# Patient Record
Sex: FEMALE | Race: WHITE | NOT HISPANIC OR LATINO | Employment: OTHER | URBAN - METROPOLITAN AREA
[De-identification: names, ages, dates, MRNs, and addresses within clinical notes are randomized per-mention and may not be internally consistent; named-entity substitution may affect disease eponyms.]

---

## 2017-01-11 ENCOUNTER — ALLSCRIPTS OFFICE VISIT (OUTPATIENT)
Dept: OTHER | Facility: OTHER | Age: 67
End: 2017-01-11

## 2017-02-15 ENCOUNTER — GENERIC CONVERSION - ENCOUNTER (OUTPATIENT)
Dept: OTHER | Facility: OTHER | Age: 67
End: 2017-02-15

## 2017-02-21 ENCOUNTER — ALLSCRIPTS OFFICE VISIT (OUTPATIENT)
Dept: OTHER | Facility: OTHER | Age: 67
End: 2017-02-21

## 2017-03-17 ENCOUNTER — GENERIC CONVERSION - ENCOUNTER (OUTPATIENT)
Dept: OTHER | Facility: OTHER | Age: 67
End: 2017-03-17

## 2017-03-27 ENCOUNTER — GENERIC CONVERSION - ENCOUNTER (OUTPATIENT)
Dept: OTHER | Facility: OTHER | Age: 67
End: 2017-03-27

## 2017-04-04 ENCOUNTER — ALLSCRIPTS OFFICE VISIT (OUTPATIENT)
Dept: OTHER | Facility: OTHER | Age: 67
End: 2017-04-04

## 2017-04-04 DIAGNOSIS — Z96.7 PRESENCE OF OTHER BONE AND TENDON IMPLANTS: ICD-10-CM

## 2017-04-05 ENCOUNTER — GENERIC CONVERSION - ENCOUNTER (OUTPATIENT)
Dept: OTHER | Facility: OTHER | Age: 67
End: 2017-04-05

## 2017-04-18 ENCOUNTER — GENERIC CONVERSION - ENCOUNTER (OUTPATIENT)
Dept: OTHER | Facility: OTHER | Age: 67
End: 2017-04-18

## 2017-05-16 ENCOUNTER — ALLSCRIPTS OFFICE VISIT (OUTPATIENT)
Dept: OTHER | Facility: OTHER | Age: 67
End: 2017-05-16

## 2017-05-22 ENCOUNTER — GENERIC CONVERSION - ENCOUNTER (OUTPATIENT)
Dept: OTHER | Facility: OTHER | Age: 67
End: 2017-05-22

## 2017-06-05 ENCOUNTER — GENERIC CONVERSION - ENCOUNTER (OUTPATIENT)
Dept: OTHER | Facility: OTHER | Age: 67
End: 2017-06-05

## 2017-07-03 ENCOUNTER — GENERIC CONVERSION - ENCOUNTER (OUTPATIENT)
Dept: OTHER | Facility: OTHER | Age: 67
End: 2017-07-03

## 2017-07-17 ENCOUNTER — GENERIC CONVERSION - ENCOUNTER (OUTPATIENT)
Dept: OTHER | Facility: OTHER | Age: 67
End: 2017-07-17

## 2017-07-18 ENCOUNTER — ALLSCRIPTS OFFICE VISIT (OUTPATIENT)
Dept: OTHER | Facility: OTHER | Age: 67
End: 2017-07-18

## 2017-07-18 DIAGNOSIS — M25.561 PAIN IN RIGHT KNEE: ICD-10-CM

## 2017-07-19 ENCOUNTER — GENERIC CONVERSION - ENCOUNTER (OUTPATIENT)
Dept: OTHER | Facility: OTHER | Age: 67
End: 2017-07-19

## 2017-08-03 ENCOUNTER — GENERIC CONVERSION - ENCOUNTER (OUTPATIENT)
Dept: OTHER | Facility: OTHER | Age: 67
End: 2017-08-03

## 2017-10-17 ENCOUNTER — ALLSCRIPTS OFFICE VISIT (OUTPATIENT)
Dept: OTHER | Facility: OTHER | Age: 67
End: 2017-10-17

## 2018-01-11 NOTE — PROGRESS NOTES
Assessment    1  Contracture of right knee (076 24) (M24 561)    Modest improvement in her right knee motion following nonsurgical management right knee contracture, this patient continue to work on achieving greater flexion of physical therapy  I would welcome the opportunity see back in 3 months time, and this wafer physical exam only  Plan  Contracture of right knee    · Follow-up visit in 3 months Evaluation and Treatment  Follow-up  Status: Hold For -  Scheduling  Requested for: [id number]    History of Present Illness  HPI: Over one year following partial patellectomy made of the right knee as salvage procedure for comminuted right patella fracture, this patient describes improve motion  Although her acute physical therapy benefits have been exhausted, she pays $15 a month, goes to the gym 4 times a week  With continued efforts and achieving greater flexion, she describes improvement in her flexion  She describes persistence of pain in the right knee joint however  Review of Systems    Constitutional: No fever, no chills, feels well, no tiredness, no recent weight gain or loss  Eyes: No complaints of eyesight problems, no red eyes  ENT: no loss of hearing, no nosebleeds, no sore throat  Cardiovascular: No complaints of chest pain, no palpitations, no leg claudication or lower extremity edema  Respiratory: no compliants of shortness of breath, no wheezing, no cough  Gastrointestinal: no complaints of abdominal pain, no constipation, no nausea or diarrhea, no vomiting, no bloody stools  Genitourinary: no complaints of dysuria, no incontinence  Musculoskeletal: as noted in HPI  Integumentary: no complaints of skin rash or lesion, no itching or dry skin, no skin wounds  Neurological: no complaints of headache, no confusion, no numbness or tingling, no dizziness     Endocrine: No complaints of muscle weakness, no feelings of weakness, no frequent urination, no excessive thirst  Psychiatric: No suicidal thoughts, no anxiety, no feelings of depression  Active Problems    1  Closed displaced comminuted fracture of right patella, initial encounter (822 0)   (S82 041A)   2  Contracture of right knee (718 46) (M24 561)   3  Right knee pain (719 46) (M25 561)   4  S/P ORIF (open reduction internal fixation) fracture (V45 89,V15 51) (Z96 7,Z87 81)    Past Medical History    The active problems and past medical history were reviewed and updated today  Social History    · Current every day smoker (305 1) (F17 200)    Current Meds   1  No Reported Medications Recorded    Allergies    1  No Known Drug Allergies    Vitals  Signs    Heart Rate: 69  Systolic: 960  Diastolic: 79  Height: 5 ft 4 in  Weight: 157 lb   BMI Calculated: 26 95  BSA Calculated: 1 76    Physical Exam  Gait pattern is with antalgic, and she utilizes a cane and is immature assist device  Breathing is not labored  Right thigh is very little atrophy  Right knee is a healed anterior incision  Extension is good flexion 95 degrees   There is very little crepitation or flexion extension  There is no palp of the synovium  This oh tenderness of the right calf        Future Appointments    Date/Time Provider Specialty Site   01/16/2018 09:45 AM BEN Reeder   Orthopedic Surgery [address]     Signatures   Electronically signed by : BEN Arevalo ; Oct 17 2017  9:56AM EST                       (Author)

## 2018-01-12 VITALS
HEART RATE: 64 BPM | SYSTOLIC BLOOD PRESSURE: 114 MMHG | WEIGHT: 151.38 LBS | HEIGHT: 64 IN | BODY MASS INDEX: 25.84 KG/M2 | DIASTOLIC BLOOD PRESSURE: 73 MMHG

## 2018-01-12 VITALS — HEART RATE: 71 BPM | DIASTOLIC BLOOD PRESSURE: 81 MMHG | SYSTOLIC BLOOD PRESSURE: 122 MMHG

## 2018-01-13 VITALS
DIASTOLIC BLOOD PRESSURE: 75 MMHG | HEART RATE: 79 BPM | BODY MASS INDEX: 24.79 KG/M2 | SYSTOLIC BLOOD PRESSURE: 114 MMHG | WEIGHT: 149 LBS

## 2018-01-13 VITALS — SYSTOLIC BLOOD PRESSURE: 126 MMHG | HEART RATE: 78 BPM | DIASTOLIC BLOOD PRESSURE: 78 MMHG

## 2018-01-13 NOTE — MISCELLANEOUS
Message  Return to work or school:   Roma Felty is under my professional care  She was seen in my office on Phone call received 28, July   She is able to return to work on   Patient may return to work predominantly sedentary secondary to fracture of her knee And restricted range of motion in the form of a contracture            Signatures   Electronically signed by : Romelia Dunn, [facility]; Jul 28 2017  1:23PM EST                       (Author)

## 2018-01-14 VITALS
HEIGHT: 64 IN | DIASTOLIC BLOOD PRESSURE: 79 MMHG | BODY MASS INDEX: 26.8 KG/M2 | HEART RATE: 69 BPM | WEIGHT: 157 LBS | SYSTOLIC BLOOD PRESSURE: 125 MMHG

## 2018-01-15 VITALS
DIASTOLIC BLOOD PRESSURE: 77 MMHG | HEIGHT: 64 IN | HEART RATE: 74 BPM | BODY MASS INDEX: 25.44 KG/M2 | SYSTOLIC BLOOD PRESSURE: 122 MMHG | WEIGHT: 149 LBS

## 2018-02-14 ENCOUNTER — OFFICE VISIT (OUTPATIENT)
Dept: OBGYN CLINIC | Facility: HOSPITAL | Age: 68
End: 2018-02-14
Payer: OTHER MISCELLANEOUS

## 2018-02-14 VITALS
WEIGHT: 161.2 LBS | HEIGHT: 67 IN | HEART RATE: 60 BPM | DIASTOLIC BLOOD PRESSURE: 87 MMHG | SYSTOLIC BLOOD PRESSURE: 126 MMHG | BODY MASS INDEX: 25.3 KG/M2

## 2018-02-14 DIAGNOSIS — M25.669 DECREASED RANGE OF MOTION (ROM) OF KNEE: Primary | ICD-10-CM

## 2018-02-14 PROCEDURE — 99213 OFFICE O/P EST LOW 20 MIN: CPT | Performed by: ORTHOPAEDIC SURGERY

## 2018-02-14 RX ORDER — AMOXICILLIN 500 MG/1
500 CAPSULE ORAL 3 TIMES DAILY
Refills: 0 | COMMUNITY
Start: 2018-02-06

## 2018-02-14 NOTE — PROGRESS NOTES
74 y o female 16 months status post right patellectomy and soft tissue repair of comminuted right patellar fracture with persistent decreased range of motion right knee  Patient has been doing exercises on her own 4 to 5 times a week at a physical therapy gym  Patient has no complaints of pain at this time  Patient states that she has been making progress with respect to right knee range of motion  She says that she was measured yesterday at approximately 105° of knee flexion  Patient denies any numbness or tingling  Patient states that she only requires cane for ambulation when traversing wet and snowy terrain  Review of Systems  Review of systems negative unless otherwise specified in HPI    Past Medical History  No past medical history on file  Past Surgical History  Past Surgical History:   Procedure Laterality Date     SECTION      KNEE SURGERY      [facility] PATELLA FX Right 10/3/2016    Procedure: SOFT TISSUE REPAIR  RIGHT KNEE;  Surgeon: Lupe Singh MD;  Location: [facility];  Service: Orthopedics       Current Medications  Current Outpatient Prescriptions on File Prior to Visit   Medication Sig Dispense Refill    [DISCONTINUED] enoxaparin (LOVENOX) 40 mg/0 4 mL Inject 0 4 mL under the skin every 24 hours for 30 days 12 mL 0     No current facility-administered medications on file prior to visit          Recent Labs (HCT,HGB,PT,INR,ESR,CRP,GLU,HgA1C)  No results found for: HCT, HGB, WBC, PT, INR, ESR, CRP, GLUCOSE, HGBA1C      Physical exam  · General: Awake, Alert, Oriented  · Eyes: Pupils equal, round and reactive to light  · Heart: regular rate and rhythm  · Lungs: No audible wheezing  · Abdomen: soft  right Knee exam  · Well-healed anterior knee surgical incision, mild erythema, no significant palpable knee effusion  · No tenderness to palpation over medial lateral joint line, no tenderness palpation over anterior knee  · Knee stable to varus valgus stress mid flexion full extension  · Distal extremity warm and sensate  · Knee range of motion:  Approximately 0-105 degrees          Assessment:   74 y  o female 60 months status post right patellectomy and soft tissue repair comminuted right patella fracture with persistent decreased knee range of motion, doing well  Plan will be as follows:  Discussion was had with patient concerning continued physical therapy and exercises to improve the right knee reduced motion with knee flexion  Patient states that she can't pay 15 dollars a month to go to physical therapy gym as often she would like  At this point patient was instructed to continue with exercises  She will remain weight-bearing to tolerance right lower extremity  Patient will be seen back in three months for repeat evaluation  The paperwork was also filled out for patient to extend her handicap parking placard

## 2018-05-14 RX ORDER — TRAMADOL HYDROCHLORIDE 50 MG/1
TABLET ORAL
Refills: 0 | COMMUNITY
Start: 2018-03-04

## 2018-05-14 RX ORDER — ONDANSETRON 8 MG/1
TABLET, ORALLY DISINTEGRATING ORAL
Refills: 0 | COMMUNITY
Start: 2018-03-04

## 2018-05-14 RX ORDER — CIPROFLOXACIN 500 MG/1
500 TABLET, FILM COATED ORAL 2 TIMES DAILY
Refills: 0 | COMMUNITY
Start: 2018-03-04

## 2018-05-15 ENCOUNTER — OFFICE VISIT (OUTPATIENT)
Dept: OBGYN CLINIC | Facility: HOSPITAL | Age: 68
End: 2018-05-15
Payer: OTHER MISCELLANEOUS

## 2018-05-15 VITALS
SYSTOLIC BLOOD PRESSURE: 116 MMHG | WEIGHT: 161.16 LBS | BODY MASS INDEX: 25.9 KG/M2 | DIASTOLIC BLOOD PRESSURE: 71 MMHG | HEIGHT: 66 IN | HEART RATE: 67 BPM

## 2018-05-15 DIAGNOSIS — Z87.81 HISTORY OF PATELLAR FRACTURE: Primary | ICD-10-CM

## 2018-05-15 PROCEDURE — 99212 OFFICE O/P EST SF 10 MIN: CPT | Performed by: ORTHOPAEDIC SURGERY

## 2018-05-15 NOTE — PROGRESS NOTES
Subjective; Adult female patient with a past history of patellar fracture that required partial patellectomy due to its comminution and repair of the patellar tendons attachment  She has had a significant recovery with much rehabilitation aimed at improving flexion  Today on arrival she has flexion to 110°  She continues to practice wellness  She is now able to operate her lawn tractor and put it in reverse that pedal being posterior to the forward pedal     History reviewed  No pertinent past medical history  Past Surgical History:   Procedure Laterality Date     SECTION      KNEE SURGERY      [facility] OPEN RX PATELLA FX Right 10/3/2016    Procedure: SOFT TISSUE REPAIR  RIGHT KNEE;  Surgeon: Mario Hutchins MD;  Location: [facility];  Service: Orthopedics       Family History   Problem Relation Age of Onset    Cancer Mother     Heart disease Father     No Known Problems Brother        Social History   Substance Use Topics    Smoking status: Current Every Day Smoker     Packs/day: 1 00     Types: Cigarettes    Smokeless tobacco: Never Used      Comment: encouraged to stop smoking packet given    Alcohol use No     Exam;    She has well-healed vertical incision overlying the right knee  She has full extension of the right knee she has flexion greater than 100°  She has no pain through range of motion  She has no pain with extension against resistance right knee    Impression; History of a right patellar fracture and subsequent ORIF and repair of the patellar tendon rupture  ;Plan    We will see her p r n  in the future regarding this condition    She is congratulated on her success,   And should continue her maintenance exercises

## 2021-04-06 DIAGNOSIS — Z23 ENCOUNTER FOR IMMUNIZATION: ICD-10-CM
